# Patient Record
Sex: FEMALE | Race: WHITE | URBAN - METROPOLITAN AREA
[De-identification: names, ages, dates, MRNs, and addresses within clinical notes are randomized per-mention and may not be internally consistent; named-entity substitution may affect disease eponyms.]

---

## 2017-06-20 ENCOUNTER — OFFICE VISIT - HEALTHEAST (OUTPATIENT)
Dept: FAMILY MEDICINE | Facility: CLINIC | Age: 27
End: 2017-06-20

## 2017-06-20 DIAGNOSIS — S49.91XA RIGHT SHOULDER INJURY, INITIAL ENCOUNTER: ICD-10-CM

## 2021-05-31 VITALS — WEIGHT: 125 LBS

## 2021-06-11 NOTE — PROGRESS NOTES
"ASSESSMENT:   1. Right shoulder injury, initial encounter  XR Shoulder Right 2 or More VWS        PLAN:  Suspect rotator cuff strain.  Rest as much as needed.  Ice the affected area 3-4 times daily for 15-20 minutes at a time.      You may use Ibuprofen, 400-600mg every 6-8 hours, for the first 48 hours, then every 6-8 hours as needed for pain.  Take each dose with food.    Recheck in 3-4 days with PCP, sooner if new or worsening symptoms. Workability reviewed and provided to patient.     SUBJECTIVE:   Jose Luis Dejesus is a 27 y.o. female presents today with 5 days complaint of right shoulder pain.Injury occurred while working as a  for [employer].    Injury occurred 5 days ago - she was pulling her seat on an airplane that was folded up against the wall and felt pain in her right shoulder.  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  She had a few days off and she had no pain. However, she returned to work yesterday and when she went to lift her bag into the overhead bin, she had pain again. Rates pain at a 6-7 on a scale of 1 to 10.  Aggravated by movement above the head, lifting. Denies numbness/tingling/weakness. Has tried Aleve with some relief. Denies previous injury to the area.     There is no problem list on file for this patient.      History   Smoking Status     Never Smoker   Smokeless Tobacco     Not on file       Current Medications:  No current outpatient prescriptions on file prior to visit.     No current facility-administered medications on file prior to visit.        Allergies:   No Known Allergies    OBJECTIVE:   Vitals:    06/20/17 1316   BP: 104/60   Patient Site: Left Arm   Patient Position: Sitting   Cuff Size: Adult Regular   Pulse: 69   Resp: 14   Temp: 98.7  F (37.1  C)   TempSrc: Oral   SpO2: 97%   Weight: 125 lb (56.7 kg)     Physical exam reveals a pleasant 27 y.o. female.   Appears " healthy, alert, cooperative and in NAD.  Lungs: Chest is clear, no wheezing or rales. Symmetric air entry throughout both lung fields.  Heart: regular rate and rhythm, no murmur, rub or gallop  MS: No asymmetry on anterior and posterior examination of the shoulder.  There is mild anterior deltoid tenderness.  Negative AC joint tenderness, Negative clavicular tenderness, Negative scapular tenderness.  Full AROM of the shoulder, tenderness with abduction >90 degrees and extension >120 degrees of the shoulder.  Negative empty can sign, Negative impingement sign.   Neuro: Deltoid strength 5/5 bilaterally.   Skin: pink, warm, dry, and without lesions on limited skin exam.     I personally did a preliminary review of xray: no fracture or dislocation (pending final review by radiologist)